# Patient Record
Sex: FEMALE | URBAN - METROPOLITAN AREA
[De-identification: names, ages, dates, MRNs, and addresses within clinical notes are randomized per-mention and may not be internally consistent; named-entity substitution may affect disease eponyms.]

---

## 2017-07-03 ENCOUNTER — OFFICE VISIT (OUTPATIENT)
Dept: FAMILY MEDICINE | Facility: CLINIC | Age: 62
End: 2017-07-03
Attending: FAMILY MEDICINE
Payer: COMMERCIAL

## 2017-07-03 VITALS
BODY MASS INDEX: 24.95 KG/M2 | DIASTOLIC BLOOD PRESSURE: 77 MMHG | SYSTOLIC BLOOD PRESSURE: 115 MMHG | WEIGHT: 135.6 LBS | HEART RATE: 68 BPM | HEIGHT: 62 IN

## 2017-07-03 DIAGNOSIS — H69.93 DYSFUNCTION OF EUSTACHIAN TUBE, BILATERAL: ICD-10-CM

## 2017-07-03 DIAGNOSIS — H60.61 CHRONIC OTITIS EXTERNA OF RIGHT EAR, UNSPECIFIED TYPE: ICD-10-CM

## 2017-07-03 DIAGNOSIS — G89.29 CHRONIC NONINTRACTABLE HEADACHE, UNSPECIFIED HEADACHE TYPE: ICD-10-CM

## 2017-07-03 DIAGNOSIS — R51.9 CHRONIC NONINTRACTABLE HEADACHE, UNSPECIFIED HEADACHE TYPE: ICD-10-CM

## 2017-07-03 DIAGNOSIS — J31.0 CHRONIC RHINITIS, UNSPECIFIED TYPE: Primary | ICD-10-CM

## 2017-07-03 PROCEDURE — 99212 OFFICE O/P EST SF 10 MIN: CPT | Mod: ZF

## 2017-07-03 RX ORDER — NEOMYCIN SULFATE, POLYMYXIN B SULFATE AND HYDROCORTISONE 10; 3.5; 1 MG/ML; MG/ML; [USP'U]/ML
4 SUSPENSION/ DROPS AURICULAR (OTIC) 4 TIMES DAILY
Qty: 10 ML | Refills: 0 | Status: SHIPPED | OUTPATIENT
Start: 2017-07-03

## 2017-07-03 RX ORDER — MOMETASONE FUROATE MONOHYDRATE 50 UG/1
2 SPRAY, METERED NASAL DAILY
Qty: 1 BOX | Refills: 3 | Status: SHIPPED | OUTPATIENT
Start: 2017-07-03

## 2017-07-03 RX ORDER — MULTIPLE VITAMINS W/ MINERALS TAB 9MG-400MCG
1 TAB ORAL DAILY
COMMUNITY

## 2017-07-03 NOTE — PROGRESS NOTES
REASON FOR VISIT  Ear problem, headaches    HPI: 62 who is having a right ear problem for 6-7 months.  Started when after a flight - she couldn't pop the right ear - it took a few days. Then it recurred two times after that again associated with a flight and then she also noticed dried blood from that ear.  She can't currently pop her ears, she does not report any tinnitus or hearing problem. She does have intermittent preauricular pain but no swelling, no drooling no fever or chills. She doesn't swim regularly, no recent URI, no noticeable nasal congestion. Nonsmoker    Her headaches are right sided, top of head and right forehead about 8/10 and deep pain.  Can come in spurts 2-3 days  -- 1 aleve seems to help.  No hx of HA's, no nausea, no light sensitivity, no hx of migraines.    Past Medical History:   Diagnosis Date     Autoimmune deficiency syndrome (H)      Cyst of ovary      Epilepsy (H)     as child - not on meds now     Frozen shoulder     left side - trying massage      Increased pressure in the eye, left      Infertility, female      Current Outpatient Prescriptions   Medication     multivitamin, therapeutic with minerals (MULTI-VITAMIN) TABS tablet     mometasone (NASONEX) 50 MCG/ACT spray     neomycin-polymyxin-hydrocortisone (CORTISPORIN) 3.5-59010-3 otic suspension     No current facility-administered medications for this visit.         Allergies   Allergen Reactions     Codeine      Social History     Social History     Marital status: Single     Spouse name: N/A     Number of children: N/A     Years of education: N/A     Occupational History     Not on file.     Social History Main Topics     Smoking status: Former Smoker     Quit date: 8/15/1988     Smokeless tobacco: Not on file     Alcohol use Not on file      Comment: every other month wine      Drug use: Not on file     Sexual activity: Not on file     Other Topics Concern     Not on file     Social History Narrative    Recently moved from  "[place] -  but still with .  He is an  - travels for work and she travels with him.  Son is in [place].        ROS: 10 point ROS neg other than the symptoms noted above in the HPI.She does report weight gain and joint pain    /77 (BP Location: Left arm, Patient Position: Chair, Cuff Size: Adult Regular)  Pulse 68  Ht 1.568 m (5' 1.75\")  Wt 61.5 kg (135 lb 9.6 oz)  BMI 25 kg/m2  EXAM:  Constitutional: healthy, alert and no distress   Cardiovascular: . RRR. No murmurs, clicks gallops or rub  Respiratory:  Lungs clear  Neck: Neck supple. No adenopathy. Thyroid symmetric, normal size,  ENT: both TM's are clear and good light reflex, no mobility, there is small amt of dried blood in the left and right canal. The right ear canal is mildly inflammed, nasal exam shows moderate swelling bilaterally, she has mild tenderness in the right preauricular area, no sinus tenderness  NEURO: Gait normal. Reflexes normal 2/4  and symmetric.cranial nerves intact, no forehead tenderness, motor is 5/5 and equal in UE and LE.   Sensation grossly WNL.  SKIN: no suspicious lesions or rashes    ASSESSMENT  Older female with right ear problem of inability to pop it and noted dried blood at times and has hx of headaches.    1. External otitis right ear - she has mild irritation of the right ear canal and some dried blood - this could be from using a cleaning device - the TM looks normal and not inflammed  2. Eustachian  tube dysfunction - this likely the cause of her inablitiy to pop her ears  And likely related to her nasal congestion  3. Nasal congestion - unclear etiology could be allergic  4. Preauricular discomfort - could be referred pain from the external otitis  5. Headaches - not migraine could be related to the nasal congestion and the right sided external otitis.     PLAN  Don't use anything smaller than your little finger to clean your ears  Use the nasonex daily in each nostril for at least 4 wks - " takes awhile to see effect  Pop your ears as much as you can  Use the ear drops - 1 drop right ear twice daily for 5-7 days  Use a hair dryer to dry the ear canals after washing your hair  Stop using the ear wash for now  When you fly pop your ears as often as you can  If no better in 1 month let me know and we can have you see ENT     I spent  25 minutes with this patient.  > 50% of time spent in counseling and coordination of care      Sandee Romero MD, PhD

## 2017-07-03 NOTE — NURSING NOTE
Chief Complaint   Patient presents with     Consult For     Headaches       Hazel Ramirez, RU 7/3/2017

## 2017-07-03 NOTE — PATIENT INSTRUCTIONS
Don't use anything smaller than your little finger to clean your ears  Use the nasonex daily in each nostril for at least 4 wks - takes awhile to see effect  Pop your ears as much as you can  Use the ear drops - 1 drop right ear twice daily for 5-7 days  Use a hair dryer to dry the ear canals after washing your hair  Stop using the ear wash for now  When you fly pop your ears as often as you can  If no better in 1 month let me know and we can have you see ENT

## 2017-07-03 NOTE — LETTER
7/3/2017       RE: Sherry aPblo  [address]  [place] [address]     Dear Colleague,    Thank you for referring your patient, Sherry Pablo, to the [facility] at [facility]. Please see a copy of my visit note below.    REASON FOR VISIT  Ear problem, headaches    HPI: 62 who is having a right ear problem for 6-7 months.  Started when after a flight - she couldn't pop the right ear - it took a few days. Then it recurred two times after that again associated with a flight and then she also noticed dried blood from that ear.  She can't currently pop her ears, she does not report any tinnitus or hearing problem. She does have intermittent preauricular pain but no swelling, no drooling no fever or chills. She doesn't swim regularly, no recent URI, no noticeable nasal congestion. Nonsmoker    Her headaches are right sided, top of head and right forehead about 8/10 and deep pain.  Can come in spurts 2-3 days  -- 1 aleve seems to help.  No hx of HA's, no nausea, no light sensitivity, no hx of migraines.    Past Medical History:   Diagnosis Date     Autoimmune deficiency syndrome (H)      Cyst of ovary      Epilepsy (H)     as child - not on meds now     Frozen shoulder     left side - trying massage      Increased pressure in the eye, left      Infertility, female      Current Outpatient Prescriptions   Medication     multivitamin, therapeutic with minerals (MULTI-VITAMIN) TABS tablet     mometasone (NASONEX) 50 MCG/ACT spray     neomycin-polymyxin-hydrocortisone (CORTISPORIN) 3.5-47876-0 otic suspension     No current facility-administered medications for this visit.         Allergies   Allergen Reactions     Codeine      Social History     Social History     Marital status: Single     Spouse name: N/A     Number of children: N/A     Years of education: N/A     Occupational History     Not on file.     Social History Main Topics     Smoking status:  "Former Smoker     Quit date: 8/15/1988     Smokeless tobacco: Not on file     Alcohol use Not on file      Comment: every other month wine      Drug use: Not on file     Sexual activity: Not on file     Other Topics Concern     Not on file     Social History Narrative    Recently moved from [place] -  but still with .  He is an  - travels for work and she travels with him.  Son is in [place].        ROS: 10 point ROS neg other than the symptoms noted above in the HPI.She does report weight gain and joint pain    /77 (BP Location: Left arm, Patient Position: Chair, Cuff Size: Adult Regular)  Pulse 68  Ht 1.568 m (5' 1.75\")  Wt 61.5 kg (135 lb 9.6 oz)  BMI 25 kg/m2  EXAM:  Constitutional: healthy, alert and no distress   Cardiovascular: . RRR. No murmurs, clicks gallops or rub  Respiratory:  Lungs clear  Neck: Neck supple. No adenopathy. Thyroid symmetric, normal size,  ENT: both TM's are clear and good light reflex, no mobility, there is small amt of dried blood in the left and right canal. The right ear canal is mildly inflammed, nasal exam shows moderate swelling bilaterally, she has mild tenderness in the right preauricular area, no sinus tenderness  NEURO: Gait normal. Reflexes normal 2/4  and symmetric.cranial nerves intact, no forehead tenderness, motor is 5/5 and equal in UE and LE.   Sensation grossly WNL.  SKIN: no suspicious lesions or rashes    ASSESSMENT  Older female with right ear problem of inability to pop it and noted dried blood at times and has hx of headaches.    1. External otitis right ear - she has mild irritation of the right ear canal and some dried blood - this could be from using a cleaning device - the TM looks normal and not inflammed  2. Eustachian  tube dysfunction - this likely the cause of her inablitiy to pop her ears  And likely related to her nasal congestion  3. Nasal congestion - unclear etiology could be allergic  4. Preauricular discomfort - " could be referred pain from the external otitis  5. Headaches - not migraine could be related to the nasal congestion and the right sided external otitis.     PLAN  Don't use anything smaller than your little finger to clean your ears  Use the nasonex daily in each nostril for at least 4 wks - takes awhile to see effect  Pop your ears as much as you can  Use the ear drops - 1 drop right ear twice daily for 5-7 days  Use a hair dryer to dry the ear canals after washing your hair  Stop using the ear wash for now  When you fly pop your ears as often as you can  If no better in 1 month let me know and we can have you see ENT     I spent  25 minutes with this patient.  > 50% of time spent in counseling and coordination of care      Sandee Romero MD, PhD

## 2017-07-03 NOTE — MR AVS SNAPSHOT
After Visit Summary   7/3/2017    Sherry Pablo    MRN: [id number]           Patient Information     Date Of Birth          1955        Visit Information        Provider Department      7/3/2017 9:00 AM Sandee Romero MD PhD [facility]        Today's Diagnoses     Chronic rhinitis, unspecified type    -  1    Chronic otitis externa of right ear, unspecified type        Chronic nonintractable headache, unspecified headache type          Care Instructions    Don't use anything smaller than your little finger to clean your ears  Use the nasonex daily in each nostril for at least 4 wks - takes awhile to see effect  Pop your ears as much as you can  Use the ear drops - 1 drop right ear twice daily for 5-7 days  Use a hair dryer to dry the ear canals after washing your hair  Stop using the ear wash for now  When you fly pop your ears as often as you can  If no better in 1 month let me know and we can have you see ENT           Follow-ups after your visit        Your next 10 appointments already scheduled     Jul 28, 2017  8:30 AM CDT   Colonoscopy with Roland Batres MD   [facility] ([facility])    [address] [place] [id number] [phone]              Who to contact     Please call your clinic at [phone] to:    Ask questions about your health    Make or cancel appointments    Discuss your medicines    Learn about your test results    Speak to your doctor   If you have compliments or concerns about an experience at your clinic, or if you wish to file a complaint, please contact [facility] Patient Relations at [phone] or email us at [email or web address]         Additional Information About Your Visit        MyChart Information     [employer] gives you secure access to your electronic health record. If you see a primary care provider, you can also send messages to  "your care team and make appointments. If you have questions, please call your primary care clinic.  If you do not have a primary care provider, please call [phone] and they will assist you.      [employer] is an electronic gateway that provides easy, online access to your medical records. With [employer], you can request a clinic appointment, read your test results, renew a prescription or communicate with your care team.     To access your existing account, please contact your [facility] or call [phone] for assistance.        Care EveryWhere ID     This is your Care EveryWhere ID. This could be used by other organizations to access your [place] medical records  [id number]        Your Vitals Were     Pulse Height BMI (Body Mass Index)             68 1.568 m (5' 1.75\") 25 kg/m2          Blood Pressure from Last 3 Encounters:   07/03/17 115/77   08/15/16 119/78    Weight from Last 3 Encounters:   07/03/17 61.5 kg (135 lb 9.6 oz)   08/15/16 60.3 kg (133 lb)              Today, you had the following     No orders found for display         Today's Medication Changes          These changes are accurate as of: 7/3/17  9:42 AM.  If you have any questions, ask your nurse or doctor.               Start taking these medicines.        Dose/Directions    mometasone 50 MCG/ACT spray   Commonly known as:  NASONEX   Used for:  Chronic rhinitis, unspecified type   Started by:  Sandee Romero MD PhD        Dose:  2 spray   Spray 2 sprays into both nostrils daily   Quantity:  1 Box   Refills:  3       neomycin-polymyxin-hydrocortisone 3.5-70127-1 otic suspension   Commonly known as:  CORTISPORIN   Used for:  Chronic nonintractable headache, unspecified headache type   Started by:  Sandee Romero MD PhD        Dose:  4 drop   Place 4 drops in ear(s) 4 times daily   Quantity:  10 mL   Refills:  0            Where to get your medicines      These medications were sent to [facility] - [place], [place] - [address] AT [facility] [address]  [address], [place] [address]     Phone:  [phone]     mometasone 50 MCG/ACT spray    neomycin-polymyxin-hydrocortisone 3.5-53915-2 otic suspension                Primary Care Provider    None Specified       No primary provider on file.        Equal Access to Services     PABLO MO : Hadii aad ku hadasho Soomaali, waaxda luqadaha, qaybta kaalmada adeegyada, waxay kemin haykin adeelia haddadjose lajaniya . So [facility] [phone].    ATENCIÓN: Si keri oquendo, tiene a villar disposición servicios gratuitos de asistencia lingüística. Benigno al [phone].    We comply with applicable federal civil rights laws and [place] laws. We do not discriminate on the basis of race, color, national origin, age, disability sex, sexual orientation or gender identity.            Thank you!     Thank you for choosing [facility]  for your care. Our goal is always to provide you with excellent care. Hearing back from our patients is one way we can continue to improve our services. Please take a few minutes to complete the written survey that you may receive in the mail after your visit with us. Thank you!             Your Updated Medication List - Protect others around you: Learn how to safely use, store and throw away your medicines at www.disposemymeds.org.          This list is accurate as of: 7/3/17  9:42 AM.  Always use your most recent med list.                   Brand Name Dispense Instructions for use Diagnosis    mometasone 50 MCG/ACT spray    NASONEX    1 Box    Spray 2 sprays into both nostrils daily    Chronic rhinitis, unspecified type       Multi-vitamin Tabs tablet      Take 1 tablet by mouth daily        neomycin-polymyxin-hydrocortisone 3.5-95659-7 otic suspension    CORTISPORIN    10 mL    Place 4 drops in ear(s) 4 times daily    Chronic nonintractable headache, unspecified headache type

## 2017-07-21 ENCOUNTER — TELEPHONE (OUTPATIENT)
Dept: GASTROENTEROLOGY | Facility: OUTPATIENT CENTER | Age: 62
End: 2017-07-21

## 2017-07-21 NOTE — TELEPHONE ENCOUNTER
Patient taking any blood thinners ? no    Heart disease ? denies    Lung disease ? denies      Sleep apnea ? denies    Diabetic ? denies    Kidney disease ? denies    Dialysis ? n/a    Electronic implanted medical devices ? denies    Are you taking any narcotic pain medication ? no  What is your daily dosage ?    PTSD ? n/a    Prep instructions reviewed with patient ? Instructions,  policy, conscious sedation plan reviewed. Advised patient to have someone stay with her post exam    Pharmacy : [facility]    Indication for procedure : Screening colonoscopy    Referring provider : Dr. Sandee Romero

## 2017-07-28 ENCOUNTER — DOCUMENTATION ONLY (OUTPATIENT)
Dept: GASTROENTEROLOGY | Facility: OUTPATIENT CENTER | Age: 62
End: 2017-07-28

## 2017-07-28 ENCOUNTER — TRANSFERRED RECORDS (OUTPATIENT)
Dept: HEALTH INFORMATION MANAGEMENT | Facility: CLINIC | Age: 62
End: 2017-07-28

## 2017-07-28 DIAGNOSIS — Z12.11 ENCOUNTER FOR SCREENING COLONOSCOPY: Primary | ICD-10-CM

## 2018-01-07 ENCOUNTER — HEALTH MAINTENANCE LETTER (OUTPATIENT)
Age: 63
End: 2018-01-07

## 2020-03-10 ENCOUNTER — HEALTH MAINTENANCE LETTER (OUTPATIENT)
Age: 65
End: 2020-03-10

## 2020-12-27 ENCOUNTER — HEALTH MAINTENANCE LETTER (OUTPATIENT)
Age: 65
End: 2020-12-27

## 2021-04-24 ENCOUNTER — HEALTH MAINTENANCE LETTER (OUTPATIENT)
Age: 66
End: 2021-04-24

## 2021-10-09 ENCOUNTER — HEALTH MAINTENANCE LETTER (OUTPATIENT)
Age: 66
End: 2021-10-09

## 2022-03-20 ENCOUNTER — HEALTH MAINTENANCE LETTER (OUTPATIENT)
Age: 67
End: 2022-03-20

## 2022-09-11 ENCOUNTER — HEALTH MAINTENANCE LETTER (OUTPATIENT)
Age: 67
End: 2022-09-11

## 2023-05-06 ENCOUNTER — HEALTH MAINTENANCE LETTER (OUTPATIENT)
Age: 68
End: 2023-05-06

## 2024-05-04 ENCOUNTER — HEALTH MAINTENANCE LETTER (OUTPATIENT)
Age: 69
End: 2024-05-04